# Patient Record
Sex: FEMALE | Race: BLACK OR AFRICAN AMERICAN | URBAN - METROPOLITAN AREA
[De-identification: names, ages, dates, MRNs, and addresses within clinical notes are randomized per-mention and may not be internally consistent; named-entity substitution may affect disease eponyms.]

---

## 2022-12-07 ENCOUNTER — OFFICE VISIT (OUTPATIENT)
Dept: URBAN - METROPOLITAN AREA CLINIC 70 | Facility: CLINIC | Age: 22
End: 2022-12-07
Payer: OTHER GOVERNMENT

## 2022-12-07 ENCOUNTER — WEB ENCOUNTER (OUTPATIENT)
Dept: URBAN - METROPOLITAN AREA CLINIC 70 | Facility: CLINIC | Age: 22
End: 2022-12-07

## 2022-12-07 VITALS
HEART RATE: 77 BPM | DIASTOLIC BLOOD PRESSURE: 69 MMHG | SYSTOLIC BLOOD PRESSURE: 106 MMHG | HEIGHT: 67 IN | TEMPERATURE: 97.5 F | WEIGHT: 151.8 LBS | BODY MASS INDEX: 23.83 KG/M2

## 2022-12-07 DIAGNOSIS — K58.1 IRRITABLE BOWEL SYNDROME WITH CONSTIPATION: ICD-10-CM

## 2022-12-07 PROBLEM — 10743008: Status: ACTIVE | Noted: 2022-12-07

## 2022-12-07 PROBLEM — 440630006: Status: ACTIVE | Noted: 2022-12-07

## 2022-12-07 PROCEDURE — 99204 OFFICE O/P NEW MOD 45 MIN: CPT

## 2022-12-07 RX ORDER — LUBIPROSTONE 8 UG/1
1 CAPSULE WITH FOOD AND WATER CAPSULE, GELATIN COATED ORAL TWICE A DAY
Qty: 180 CAPSULE | Refills: 3 | OUTPATIENT
Start: 2022-12-07 | End: 2023-12-02

## 2022-12-07 NOTE — HPI-TODAY'S VISIT:
The pt presents for a hospital f/u.  She was seen in the ED 11/28/2022 for worms in her stool.  She was diagnosed with roundworms due to the picture she showed provides in the ED.  She was not able to produce a stool sample while at the hospital.  No stool studies collected.  She was treated empirically with Albendazole.  Today she states single episode of parasites in stool resolved.  She admits to a hx of alternating constipation and diarrhea and was diagnosed with IBS by a previous GI office.  She states prior EGD, colonoscopy, and labs were all normal.  She admits to constipation with bowel movements every 2-3 days without feelings of complete evacuation, followed by a single episode of diarrhea.  She states this cycle repeats each week.  She has tried probiotics, stool softeners, miralax, metamucil, and Linzess 72, 145, and 290mcg doses with no improvement in symptoms. She denies rectal bleeding or weight loss.

## 2023-01-17 ENCOUNTER — DASHBOARD ENCOUNTERS (OUTPATIENT)
Age: 23
End: 2023-01-17

## 2023-01-19 ENCOUNTER — OFFICE VISIT (OUTPATIENT)
Dept: URBAN - METROPOLITAN AREA CLINIC 70 | Facility: CLINIC | Age: 23
End: 2023-01-19

## 2023-01-19 RX ORDER — LUBIPROSTONE 8 UG/1
1 CAPSULE WITH FOOD AND WATER CAPSULE, GELATIN COATED ORAL TWICE A DAY
Qty: 180 CAPSULE | Refills: 3 | Status: ACTIVE | COMMUNITY
Start: 2022-12-07 | End: 2023-12-02

## 2023-02-01 ENCOUNTER — TELEPHONE ENCOUNTER (OUTPATIENT)
Dept: URBAN - METROPOLITAN AREA CLINIC 70 | Facility: CLINIC | Age: 23
End: 2023-02-01

## 2023-02-01 RX ORDER — LACTULOSE 10 G/15ML
30ML SOLUTION ORAL ONCE A DAY
Qty: 2700 ML | Refills: 3 | OUTPATIENT
Start: 2023-02-01 | End: 2024-01-27

## 2023-02-07 ENCOUNTER — TELEPHONE ENCOUNTER (OUTPATIENT)
Dept: URBAN - METROPOLITAN AREA CLINIC 70 | Facility: CLINIC | Age: 23
End: 2023-02-07

## 2023-02-07 RX ORDER — DICYCLOMINE HYDROCHLORIDE 20 MG/1
1 TABLET TABLET ORAL THREE TIMES A DAY
Qty: 270 TABLET | Refills: 3 | OUTPATIENT
Start: 2023-02-07 | End: 2024-02-02

## 2024-06-04 ENCOUNTER — TELEPHONE ENCOUNTER (OUTPATIENT)
Dept: URBAN - METROPOLITAN AREA CLINIC 118 | Facility: CLINIC | Age: 24
End: 2024-06-04